# Patient Record
Sex: FEMALE | Race: BLACK OR AFRICAN AMERICAN | URBAN - METROPOLITAN AREA
[De-identification: names, ages, dates, MRNs, and addresses within clinical notes are randomized per-mention and may not be internally consistent; named-entity substitution may affect disease eponyms.]

---

## 2017-08-13 ENCOUNTER — OFFICE VISIT (OUTPATIENT)
Dept: URGENT CARE | Facility: CLINIC | Age: 37
End: 2017-08-13
Payer: COMMERCIAL

## 2017-08-13 VITALS
HEART RATE: 91 BPM | HEIGHT: 59 IN | TEMPERATURE: 98 F | SYSTOLIC BLOOD PRESSURE: 118 MMHG | BODY MASS INDEX: 25.2 KG/M2 | OXYGEN SATURATION: 100 % | DIASTOLIC BLOOD PRESSURE: 76 MMHG | WEIGHT: 125 LBS | RESPIRATION RATE: 18 BRPM

## 2017-08-13 DIAGNOSIS — K52.9 GASTROENTERITIS: Primary | ICD-10-CM

## 2017-08-13 PROCEDURE — 3008F BODY MASS INDEX DOCD: CPT | Mod: S$GLB,,, | Performed by: FAMILY MEDICINE

## 2017-08-13 PROCEDURE — 99214 OFFICE O/P EST MOD 30 MIN: CPT | Mod: S$GLB,,, | Performed by: FAMILY MEDICINE

## 2017-08-13 RX ORDER — FLUCONAZOLE 100 MG/1
150 TABLET ORAL DAILY
Qty: 3 TABLET | Refills: 0 | Status: SHIPPED | OUTPATIENT
Start: 2017-08-13 | End: 2017-08-15

## 2017-08-13 RX ORDER — METRONIDAZOLE 500 MG/1
500 TABLET ORAL EVERY 12 HOURS
Qty: 14 TABLET | Refills: 0 | Status: SHIPPED | OUTPATIENT
Start: 2017-08-13 | End: 2017-08-20

## 2017-08-13 RX ORDER — CIPROFLOXACIN 500 MG/1
500 TABLET ORAL 2 TIMES DAILY
Qty: 14 TABLET | Refills: 0 | Status: SHIPPED | OUTPATIENT
Start: 2017-08-13 | End: 2017-08-20

## 2017-08-13 NOTE — PROGRESS NOTES
Subjective:       Patient ID: Tammy Pearl is a 36 y.o. female.    Chief Complaint: Diarrhea    Patient states she been out of the country. Patient states she been back from out the country since 07/28/2017 and she is still having some diarrhea. Patient states she do not have abdominal pain at this present time. Patient reports she was in [place] late last month and has had diarrhea since that time. + bloating and cramps with the bowel movements but otherwise feeling well. Was in the [place] areas of [place].       Diarrhea    This is a new problem. The current episode started 1 to 4 weeks ago. The problem occurs less than 2 times per day. The problem has been gradually worsening. The stool consistency is described as watery. The patient states that diarrhea awakens her from sleep. Associated symptoms include bloating. Pertinent negatives include no abdominal pain, chills, fever, headaches or vomiting. Nothing aggravates the symptoms. Risk factors include travel to endemic area. Treatments tried: immoduim  The treatment provided mild relief. There is no history of bowel resection or a recent abdominal surgery.     Review of Systems   Constitution: Positive for decreased appetite. Negative for chills and fever.   HENT: Negative for headaches and sore throat.    Eyes: Negative for blurred vision.   Cardiovascular: Negative for chest pain.   Respiratory: Negative for shortness of breath.    Skin: Negative for rash.   Musculoskeletal: Negative for back pain and joint pain.   Gastrointestinal: Positive for bloating and diarrhea. Negative for abdominal pain, nausea and vomiting.   Psychiatric/Behavioral: The patient is not nervous/anxious.        Objective:      Physical Exam   Constitutional: She appears well-nourished.   HENT:   Head: Normocephalic.   Mouth/Throat: Oropharynx is clear and moist and mucous membranes are normal.   Cardiovascular: Normal rate, regular rhythm and normal heart sounds.    Pulmonary/Chest:  Effort normal and breath sounds normal.   Abdominal: Soft. Bowel sounds are normal. She exhibits no distension and no mass. There is no tenderness. There is no rebound and no guarding. No hernia.   Nursing note and vitals reviewed.      Assessment:       1. Gastroenteritis        Plan:           Medications Ordered This Encounter      ciprofloxacin HCl (CIPRO) 500 MG tablet          Sig: Take 1 tablet (500 mg total) by mouth 2 (two) times daily.          Dispense:  14 tablet          Refill:  0      fluconazole (DIFLUCAN) 100 MG tablet          Sig: Take 1.5 tablets (150 mg total) by mouth once daily.          Dispense:  3 tablet          Refill:  0      metronidazole (FLAGYL) 500 MG tablet          Sig: Take 1 tablet (500 mg total) by mouth every 12 (twelve) hours.          Dispense:  14 tablet          Refill:  0         Discussed appropriate diet with patient and need to followup with PCP if symptoms persists